# Patient Record
Sex: MALE | Race: BLACK OR AFRICAN AMERICAN | Employment: FULL TIME | URBAN - METROPOLITAN AREA
[De-identification: names, ages, dates, MRNs, and addresses within clinical notes are randomized per-mention and may not be internally consistent; named-entity substitution may affect disease eponyms.]

---

## 2020-02-17 ENCOUNTER — HOSPITAL ENCOUNTER (EMERGENCY)
Age: 35
Discharge: HOME | End: 2020-02-17
Attending: EMERGENCY MEDICINE | Admitting: EMERGENCY MEDICINE
Payer: SELF-PAY

## 2020-02-17 VITALS
RESPIRATION RATE: 16 BRPM | BODY MASS INDEX: 27.09 KG/M2 | OXYGEN SATURATION: 95 % | HEIGHT: 72 IN | DIASTOLIC BLOOD PRESSURE: 75 MMHG | WEIGHT: 200 LBS | SYSTOLIC BLOOD PRESSURE: 136 MMHG | HEART RATE: 80 BPM | TEMPERATURE: 98.8 F

## 2020-02-17 DIAGNOSIS — Z20.2 EXPOSURE TO TRICHOMONAS: Primary | ICD-10-CM

## 2020-02-17 LAB
C TRACH RRNA SPEC QL NAA+PROBE: NEGATIVE
N GONORRHOEA RRNA SPEC QL NAA+PROBE: NEGATIVE
SPECIMEN SOURCE: NORMAL

## 2020-02-17 PROCEDURE — 99284 EMERGENCY DEPT VISIT MOD MDM: CPT

## 2020-02-17 PROCEDURE — 74011250637 HC RX REV CODE- 250/637: Performed by: EMERGENCY MEDICINE

## 2020-02-17 PROCEDURE — 87661 TRICHOMONAS VAGINALIS AMPLIF: CPT

## 2020-02-17 PROCEDURE — 87491 CHLMYD TRACH DNA AMP PROBE: CPT

## 2020-02-17 RX ORDER — METRONIDAZOLE 250 MG/1
2000 TABLET ORAL
Status: COMPLETED | OUTPATIENT
Start: 2020-02-17 | End: 2020-02-17

## 2020-02-17 RX ADMIN — METRONIDAZOLE 2000 MG: 250 TABLET ORAL at 07:21

## 2020-02-17 NOTE — LETTER
[address] [facility] EMERGENCY DEPT 
[address] 
[address] [facility] [address] [phone] Work/School Note Date: 2/17/2020 To Whom It May concern: 
 
Dinorah Groves was seen and treated today in the emergency room by the following provider(s): 
Attending Provider: Marylen Larch, MD.   
 
Dinorah Groves may return to work on 02/18/2020. Sincerely, Bonnie Marte MD

## 2020-02-17 NOTE — DISCHARGE INSTRUCTIONS
Patient Education        Exposure to Sexually Transmitted Infections: Care Instructions  Your Care Instructions  Sexually transmitted infections (STIs) are those diseases spread by sexual contact. There are at least 20 different STIs, including chlamydia, gonorrhea, syphilis, and human immunodeficiency virus (HIV), which causes AIDS. Bacteria-caused STIs can be treated and cured. STIs caused by viruses, such as HIV, can be treated but not cured. Some STIs can reduce a woman's chances of getting pregnant in the future. STIs are spread during sexual contact, such as vaginal intercourse and oral or anal sex. Follow-up care is a key part of your treatment and safety. Be sure to make and go to all appointments, and call your doctor if you are having problems. It's also a good idea to know your test results and keep a list of the medicines you take. How can you care for yourself at home? · Your doctor may have given you a shot of antibiotics. If your doctor prescribed antibiotic pills, take them as directed. Do not stop taking them just because you feel better. You need to take the full course of antibiotics. · Do not have sexual contact while you have symptoms of an STI or are being treated for an STI. · Tell your sex partner (or partners) that he or she will need treatment. · If you are a woman, do not douche. Douching changes the normal balance of bacteria in the vagina and may spread an infection up into your reproductive organs. To prevent exposure to STIs in the future  · Use latex condoms every time you have sex. Use them from the beginning to the end of sexual contact. · Talk to your partner before you have sex. Find out if he or she has or is at risk for any STI. Keep in mind that a person may be able to spread an STI even if he or she does not have symptoms. · Do not have sex if you are being treated for an STI.   · Do not have sex with anyone who has symptoms of an STI, such as sores on the genitals or mouth.  · Having one sex partner (who does not have STIs and does not have sex with anyone else) is a good way to avoid STIs. When should you call for help? Call your doctor now or seek immediate medical care if:    · You have new pain in your belly or pelvis.     · You have symptoms of a urinary tract infection. These may include:  ? Pain or burning when you urinate. ? A frequent need to urinate without being able to pass much urine. ? Pain in the flank, which is just below the rib cage and above the waist on either side of the back. ? Blood in your urine. ? A fever.     · You have new or worsening pain or swelling in the scrotum.    Watch closely for changes in your health, and be sure to contact your doctor if:    · You have unusual vaginal bleeding.     · You have a discharge from the vagina or penis.     · You have any new symptoms, such as sores, bumps, rashes, blisters, or warts.     · You have itching, tingling, pain, or burning in the genital or anal area.     · You think you may have an STI. Where can you learn more? Go to http://bobby-isabella.info/. Enter E758 in the search box to learn more about \"Exposure to Sexually Transmitted Infections: Care Instructions. \"  Current as of: September 11, 2018  Content Version: 12.2  © 4946-2957 [employer]. Care instructions adapted under license by [employer] (which disclaims liability or warranty for this information). If you have questions about a medical condition or this instruction, always ask your healthcare professional. [place] [address] any warranty or liability for your use of this information.

## 2020-02-17 NOTE — ED PROVIDER NOTES
Date: 2/17/2020  Patient Name: Ramone Green    History of Presenting Illness     Chief Complaint   Patient presents with    Exposure to STD       History Provided By: Patient      HPI/Chief Complaint: (Context):who presents with chief complaint of exposure to STI/trichomoniasis. Partner has been told and treated  Patient has no complaint of fever chills vomiting diarrhea chest pain shortness of breath abdominal pain no focal arm or leg weakness  Patient is no penile discharge no penile ulcer no glandular swelling in his penis no rectal pain. No history of the same patient is a he is never been tested for HIV or other STDs is the first exposure  Patient denies any other complaints  There is no radiation of pain there is no pain there is no radiation symptom no acute alleviating or exacerbating factors. ---  Patient's triage note is reviewed KATERIN of 5  Patient with normal vitals with slightly elevated blood pressure although I will have patient rechecked for the blood pressure 136/75  Pulse ox 95% room air  Patient's no other complaints  Patient's allergies none  Patient's home medications none  Patient's past medical history is for asthma  Patient social is currently smoker no alcohol or occasional marijuana use  Patient was treated in December for acute cystitis. Patient was treated for STI in April 2015        PCP: None    Current Facility-Administered Medications   Medication Dose Route Frequency Provider Last Rate Last Dose    metroNIDAZOLE (FLAGYL) tablet 2,000 mg  2,000 mg Oral NOW Shanda Gowers, MD           Past History     Past Medical History:  Past Medical History:   Diagnosis Date    Asthma        Past Surgical History:  No past surgical history on file. Family History:  No family history on file.     Social History:  Social History     Tobacco Use    Smoking status: Current Every Day Smoker   Substance Use Topics    Alcohol use: Not on file    Drug use: Yes     Types: Marijuana Allergies:  No Known Allergies      Review of Systems   Review of Systems   Constitutional: Negative for activity change, fatigue and fever. HENT: Negative for congestion and rhinorrhea. Eyes: Negative for visual disturbance. Respiratory: Negative for shortness of breath. Cardiovascular: Negative for chest pain and palpitations. Gastrointestinal: Negative for abdominal pain, diarrhea, nausea and vomiting. Genitourinary: Negative for dysuria and hematuria. Musculoskeletal: Negative for back pain. Skin: Negative for rash. Neurological: Negative for dizziness, weakness and light-headedness. Psychiatric/Behavioral: Negative for agitation. All other systems reviewed and are negative. Physical Exam     Physical Exam  Constitutional:       Appearance: He is well-developed. HENT:      Head: Normocephalic and atraumatic. Eyes:      Conjunctiva/sclera: Conjunctivae normal.      Pupils: Pupils are equal, round, and reactive to light. Neck:      Musculoskeletal: Normal range of motion and neck supple. Cardiovascular:      Rate and Rhythm: Normal rate and regular rhythm. Pulmonary:      Effort: Pulmonary effort is normal.      Breath sounds: Normal breath sounds. Abdominal:      General: Bowel sounds are normal.      Palpations: Abdomen is soft. Musculoskeletal: Normal range of motion. Lymphadenopathy:      Cervical: No cervical adenopathy. Skin:     General: Skin is warm. Neurological:      General: No focal deficit present. Mental Status: He is alert. Medical Decision Making   I am the first provider for this patient. I reviewed the vital signs, available nursing notes, past medical history, past surgical history, family history and social history.       Provider Notes (Medical Decision Making): Patient with STI exposure  I will treat for trichomoniasis I will send for other STD testing as well the patient is a does not want to be treated as that is the only exposure he has had  Patient also stated that he will follow-up with Department of Health. I explained to him he needs further HIV and syphilis testing as patient could have exposure to others as there is Co. infection sometimes in cases such as his. He understands all instructions any change or worsen or return to the emergency department. All questions have been answered for the patient        Vital Signs-Reviewed the patient's vital signs. Pulse Oximetry Analysis -95%, room air, normal        Vitals:    02/17/20 0631   BP: 136/75   Pulse: 80   Temp: 98.8 °F (37.1 °C)   SpO2: 95%   Weight: 90.7 kg (200 lb)   Height: 6' (1.829 m)       Records Reviewed: Nursing Notes    ED Course:              Diagnostic Study Results     Orders Placed This Encounter    CHLAMYDIA/NEISSERIA AMPLIFICATION     Standing Status:   Standing     Number of Occurrences:   1     Order Specific Question:   Specimen type/source     Answer:   Urine [258]    metroNIDAZOLE (FLAGYL) tablet 2,000 mg     Order Specific Question:   Antibiotic Indications     Answer:   STD infection       Labs -   No results found for this or any previous visit (from the past 12 hour(s)). Radiologic Studies -   No orders to display     CT Results  (Last 48 hours)    None        CXR Results  (Last 48 hours)    None              Discharge     Clinical Impression:   1.  Exposure to trichomonas        Disposition:  Home  It should be noted that I will be the provider of record for this patient  Dionne Swanson MD      Follow-up Information     Follow up With Specialties Details Why [facility] EMERGENCY DEPT Emergency Medicine  If symptoms worsen [address] [place] [address] [facility]  Call in 1 day Follow Up From Emergency Department [address] [facility] [address] [address]  [address] [facility] [address] In 1 day Follow Up From Emergency Department [address] [facility] [place], [place] [facility]  [phone]          There are no discharge medications for this patient.

## 2020-02-17 NOTE — ED TRIAGE NOTES
Pt arrives in triage a/o x 4 with c/c of possible STD from partner. Wants to check in. No penile discharge/ complaints.

## 2020-02-19 LAB
SPECIMEN SOURCE: NORMAL
T VAGINALIS RRNA VAG QL NAA+PROBE: NEGATIVE